# Patient Record
Sex: FEMALE | Race: BLACK OR AFRICAN AMERICAN | NOT HISPANIC OR LATINO
[De-identification: names, ages, dates, MRNs, and addresses within clinical notes are randomized per-mention and may not be internally consistent; named-entity substitution may affect disease eponyms.]

---

## 2020-02-03 PROBLEM — Z00.00 ENCOUNTER FOR PREVENTIVE HEALTH EXAMINATION: Status: ACTIVE | Noted: 2020-02-03

## 2020-07-21 ENCOUNTER — APPOINTMENT (OUTPATIENT)
Dept: GASTROENTEROLOGY | Facility: CLINIC | Age: 45
End: 2020-07-21

## 2021-02-23 ENCOUNTER — OUTPATIENT (OUTPATIENT)
Dept: OUTPATIENT SERVICES | Facility: HOSPITAL | Age: 46
LOS: 1 days | Discharge: HOME | End: 2021-02-23

## 2022-08-09 ENCOUNTER — EMERGENCY (EMERGENCY)
Facility: HOSPITAL | Age: 47
LOS: 0 days | Discharge: HOME | End: 2022-08-09
Attending: EMERGENCY MEDICINE | Admitting: EMERGENCY MEDICINE

## 2022-08-09 VITALS
OXYGEN SATURATION: 100 % | DIASTOLIC BLOOD PRESSURE: 74 MMHG | SYSTOLIC BLOOD PRESSURE: 124 MMHG | HEART RATE: 89 BPM | RESPIRATION RATE: 20 BRPM | WEIGHT: 149.03 LBS | TEMPERATURE: 99 F

## 2022-08-09 DIAGNOSIS — L72.3 SEBACEOUS CYST: ICD-10-CM

## 2022-08-09 DIAGNOSIS — L02.811 CUTANEOUS ABSCESS OF HEAD [ANY PART, EXCEPT FACE]: ICD-10-CM

## 2022-08-09 DIAGNOSIS — F32.A DEPRESSION, UNSPECIFIED: ICD-10-CM

## 2022-08-09 PROCEDURE — 99283 EMERGENCY DEPT VISIT LOW MDM: CPT | Mod: 25

## 2022-08-09 PROCEDURE — 10060 I&D ABSCESS SIMPLE/SINGLE: CPT

## 2022-08-09 RX ORDER — CEPHALEXIN 500 MG
1 CAPSULE ORAL
Qty: 28 | Refills: 0
Start: 2022-08-09 | End: 2022-08-15

## 2022-08-09 RX ORDER — OXYCODONE AND ACETAMINOPHEN 5; 325 MG/1; MG/1
1 TABLET ORAL ONCE
Refills: 0 | Status: DISCONTINUED | OUTPATIENT
Start: 2022-08-09 | End: 2022-08-09

## 2022-08-09 RX ADMIN — OXYCODONE AND ACETAMINOPHEN 1 TABLET(S): 5; 325 TABLET ORAL at 18:36

## 2022-08-09 NOTE — ED PROVIDER NOTE - OBJECTIVE STATEMENT
46-year-old female with PMH of depression who presents with abscess to head x5 days.  Worsening and growing in size.  Patient does not remember any trauma to that area.  No new hair styles no new hair products.  Patient denies any fevers, chills, headache, blurry vision, hearing changes, neck pain, trauma, falls.

## 2022-08-09 NOTE — ED PROVIDER NOTE - NS ED ROS FT
Review of Systems:  CONSTITUTIONAL: No fever, No diaphoresis, No generalized weakness  SKIN: + rash  HEMATOLOGIC: No abnormal bleeding or bruising  MUSCULOSKELETAL: No joint paint, No swelling, No back pain  NEUROLOGIC: No numbness, No focal weakness, No headache, No dizziness  All other systems negative, unless specified in HPI

## 2022-08-09 NOTE — ED PROVIDER NOTE - PHYSICAL EXAMINATION
CONSTITUTIONAL: in no acute distress, afebrile  SKIN: Warm, dry  HEAD: 3 cm focal round fluctuant area on top of head, with dry scaling, no active bleeding or scab, no significant erythema, no lesions  EYES: No conjunctival injection. EOMI. PERRLA  ENT: No nasal discharge; oropharynx nonerythematous; airway clear  NECK: Supple; non tender  EXT: Normal ROM.  No clubbing or cyanosis.  No edema  NEURO: A&O x3, grossly unremarkable, no focal deficits  PSYCH: Cooperative, appropriate  *Chaperone RN Acacia was used during the encounter

## 2022-08-09 NOTE — ED PROVIDER NOTE - NSFOLLOWUPINSTRUCTIONS_ED_ALL_ED_FT
- Please follow up with your Primary Care Physician  - Please  the medication sent to your pharmacy and take as prescribed    Abscess    An abscess is an infected area that contains a collection of pus and debris. It can occur in almost any part of the body and occurs when the tissue gets infection. Symptoms include a painful mass that is red, warm, tender that might break open and have drainage. If your health care provider gave you antibiotics make sure to take the full course and do not stop even if feeling better.     SEEK MEDICAL CARE IF YOU HAVE THE FOLLOWING SYMPTOMS: chills, fever, muscle aches, or red streaking from the area.

## 2022-08-09 NOTE — ED PROVIDER NOTE - ATTENDING CONTRIBUTION TO CARE
47yo woman no significant PMH presents with 3cm abscess/infected sebaceous cyst to the vertex of her scalp without constitutional sx. She is well appearing but uncomfortable due to pain. I&D, abx, PMD f/u.

## 2022-08-09 NOTE — ED PROVIDER NOTE - PATIENT PORTAL LINK FT
You can access the FollowMyHealth Patient Portal offered by [facility] by registering at the following website: [email or web address]. By joining [facility]’s FollowMyHealth portal, you will also be able to view your health information using other applications (apps) compatible with our system.

## 2022-08-09 NOTE — ED PROVIDER NOTE - PROGRESS NOTE DETAILS
AH - abscess drained, left open, purulent fluid drained, rx sentl Patient to be discharged from ED. Any available test results were discussed with patient and/or family. Verbal instructions given, including instructions to return to ED immediately for any new, worsening, or concerning symptoms. Strict return precautions given. Patient endorsed understanding. Written discharge instructions additionally given, including follow-up plan

## 2022-08-09 NOTE — ED ADULT NURSE NOTE - OBJECTIVE STATEMENT
Pt states she noticed a pimple on the top of her head 4 days ago but has worsened and now feels drainage and c/o redness and pain behind right ear. Pt denies fevers, n/v/d.
